# Patient Record
Sex: FEMALE | Race: ASIAN | URBAN - METROPOLITAN AREA
[De-identification: names, ages, dates, MRNs, and addresses within clinical notes are randomized per-mention and may not be internally consistent; named-entity substitution may affect disease eponyms.]

---

## 2017-04-18 ENCOUNTER — OFFICE VISIT (OUTPATIENT)
Dept: OPHTHALMOLOGY | Facility: CLINIC | Age: 82
End: 2017-04-18
Attending: OPHTHALMOLOGY
Payer: COMMERCIAL

## 2017-04-18 DIAGNOSIS — H35.30 MYOPIC MACULAR DEGENERATION: Primary | ICD-10-CM

## 2017-04-18 DIAGNOSIS — H35.022: ICD-10-CM

## 2017-04-18 DIAGNOSIS — H35.3190 AGE-RELATED MACULAR DEGENERATION WITH CENTRAL GEOGRAPHIC ATROPHY: ICD-10-CM

## 2017-04-18 DIAGNOSIS — H33.002: ICD-10-CM

## 2017-04-18 DIAGNOSIS — Z96.1 PSEUDOPHAKIA: ICD-10-CM

## 2017-04-18 PROCEDURE — 99214 OFFICE O/P EST MOD 30 MIN: CPT | Mod: ZF

## 2017-04-18 PROCEDURE — 92015 DETERMINE REFRACTIVE STATE: CPT | Mod: ZF

## 2017-04-18 PROCEDURE — 92134 CPTRZ OPH DX IMG PST SGM RTA: CPT | Mod: ZF | Performed by: OPHTHALMOLOGY

## 2017-04-18 PROCEDURE — 76512 OPH US DX B-SCAN: CPT | Mod: ZF | Performed by: OPHTHALMOLOGY

## 2017-04-18 ASSESSMENT — CONF VISUAL FIELD
OD_NORMAL: 1
OS_SUPERIOR_NASAL_RESTRICTION: 1
OS_INFERIOR_NASAL_RESTRICTION: 1
OS_INFERIOR_TEMPORAL_RESTRICTION: 1
OS_SUPERIOR_TEMPORAL_RESTRICTION: 1

## 2017-04-18 ASSESSMENT — TONOMETRY
IOP_METHOD: ICARE
OS_IOP_MMHG: 09
OD_IOP_MMHG: 13

## 2017-04-18 ASSESSMENT — REFRACTION_MANIFEST
OS_AXIS: 135
OS_CYLINDER: +0.75
OS_SPHERE: -5.75
OD_AXIS: 010
OD_ADD: +2.50
OD_CYLINDER: +1.50
OD_SPHERE: -3.25
OS_ADD: +2.50

## 2017-04-18 ASSESSMENT — VISUAL ACUITY
OS_SC: CF 1'
OD_SC: 20/125
METHOD: SNELLEN - LINEAR

## 2017-04-18 ASSESSMENT — EXTERNAL EXAM - LEFT EYE: OS_EXAM: NORMAL

## 2017-04-18 ASSESSMENT — SLIT LAMP EXAM - LIDS
COMMENTS: NORMAL
COMMENTS: NORMAL

## 2017-04-18 ASSESSMENT — EXTERNAL EXAM - RIGHT EYE: OD_EXAM: NORMAL

## 2017-04-18 NOTE — PROGRESS NOTES
Gretchen Hernandez is a 82 year old female who presents for evaluation of poor vision both eyes for many years.  reports that vision remains poor after cataract surgery last year and that she needs new glasses. She was seeing an ophthalmologist in [place] who started her on AREDS.  Denies ocular injections or previous surgery aside from cataract extraction.  She has had numerous falls in the past with head injury.  Denies globe injury.  Per , left eye was felt to possible have optic nerve injury following a fall and vision was unable to be improved.  Reports very high glasses prescription prior to cataract extraction     Ocular Meds  AREDS    Assessment & Plan      Gretchen Hernandez is a 82 year old female with the following diagnoses:   1. Myopic macular degeneration    2. 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 Detachment, retinal, with retinal defect, left    4. Exudative retinopathy, left    5. Pseudophakia - Both Eyes         Likely myopic degeneration given fundus appearance and history of strong prescription.  Remains significantly myopic following cataract extraction   Newly diagnosed macular retinal detachment today, patient does not report recent vision change left eye since a fall that occurred 10 years ago  Bscan today confirms chronic appearing exudative retinal detachment at the macula.  No underlying mass.  Scleral thickening, ? Possible chronic hypotony vs early phthisis  Significant Geographic atrophy in both eyes   Visual acuity improved with glasses right eye.  Recommend prescription with full-time wear for monocular precaution.    Recommend consultation with retina clinic to assess left eye and establish care/monitoring for myopic degeneration  Consider low vision consult after retina exam    Patient disposition:   Return in about 4 weeks (around 5/16/2017) for Follow Up, Retina.          Rosendo Syed MD    Attending Physician Attestation: Complete documentation of  historical and exam elements from today's encounter can be found in the full encounter summary report (not reduplicated in this progress note). I personally obtained the chief complaint(s) and history of present illness.  I confirmed and edited as necessary the review of systems, past medical/surgical history, family history, social history, and examination findings as documented by others; and I examined the patient myself. I personally reviewed the relevant tests, images, and reports as documented above. I formulated and edited as necessary the assessment and plan and discussed the findings and management plan with the patient and family. - Haider Parsons MD  4/19/2017   Attending Physician Image/Tesing Attestation: I have personally view and interpreted all testing/images as documented in imaging/procedures

## 2017-04-18 NOTE — LETTER
4/18/2017       RE: Gretchen Hernandez  [address]  Located within [facility] [address]     Dear Colleague,    Thank you for referring your patient, Gretchen Hernandez, to the EYE CLINIC at [facility]. Please see a copy of my visit note below.    Gretchen Hernandez is a 82 year old female who presents for evaluation of poor vision both eyes for many years.  reports that vision remains poor after cataract surgery last year and that she needs new glasses. She was seeing an ophthalmologist in [place] who started her on AREDS.  Denies ocular injections or previous surgery aside from cataract extraction.  She has had numerous falls in the past with head injury.  Denies globe injury.  Per , left eye was felt to possible have optic nerve injury following a fall and vision was unable to be improved.  Reports very high glasses prescription prior to cataract extraction     Ocular Meds  AREDS    Assessment & Plan      Gretchen Hernandez is a 82 year old female with the following diagnoses:   1. Myopic macular degeneration    2. 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 Detachment, retinal, with retinal defect, left    4. Exudative retinopathy, left    5. Pseudophakia - Both Eyes         Likely myopic degeneration given fundus appearance and history of strong prescription.  Remains significantly myopic following cataract extraction   Newly diagnosed macular retinal detachment today, patient does not report recent vision change left eye since a fall that occurred 10 years ago  Bscan today confirms chronic appearing exudative retinal detachment at the macula.  No underlying mass.  Scleral thickening, ? Possible chronic hypotony vs early phthisis  Significant Geographic atrophy in both eyes   Visual acuity improved with glasses right eye.  Recommend prescription with full-time wear for monocular precaution.    Recommend consultation with retina clinic to assess left eye and  establish care/monitoring for myopic degeneration  Consider low vision consult after retina exam    Patient disposition:   Return in about 4 weeks (around 5/16/2017) for Follow Up, Retina.          Rosendo Syed MD    Attending Physician Attestation: Complete documentation of historical and exam elements from today's encounter can be found in the full encounter summary report (not reduplicated in this progress note). I personally obtained the chief complaint(s) and history of present illness.  I confirmed and edited as necessary the review of systems, past medical/surgical history, family history, social history, and examination findings as documented by others; and I examined the patient myself. I personally reviewed the relevant tests, images, and reports as documented above. I formulated and edited as necessary the assessment and plan and discussed the findings and management plan with the patient and family. - Haider Parsons MD  4/19/2017   Attending Physician Image/Tesing Attestation: I have personally view and interpreted all testing/images as documented in imaging/procedures       Again, thank you for allowing me to participate in the care of your patient.      Sincerely,    Haider Parsons MD

## 2017-04-18 NOTE — MR AVS SNAPSHOT
After Visit Summary   4/18/2017    Gretchen Hernandez    MRN: [id number]           Patient Information     Date Of Birth          12/20/1934        Visit Information        Provider Department      4/18/2017 2:15 PM Haider Parsons MD; [facility]        Today's Diagnoses     Macular degeneration - Both Eyes    -  1    Exudative retinopathy, left        Detachment, retinal, with retinal defect, left           Follow-ups after your visit        Follow-up notes from your care team     Return in about 4 weeks (around 5/16/2017) for Follow Up, Retina.      Your next 10 appointments already scheduled     Jul 25, 2017 12:30 PM CDT   NEW RETINA with Bibiana Johnson MD   Eye Clinic ([facility])    [address]  [address]  [address]  [facility] [id number] [phone]              Who to contact     Please call your clinic at [phone] to:    Ask questions about your health    Make or cancel appointments    Discuss your medicines    Learn about your test results    Speak to your doctor   If you have compliments or concerns about an experience at your clinic, or if you wish to file a complaint, please contact [facility] Patient Relations at [phone] or email us at [email or web address]         Additional Information About Your Visit        MyChart Information     [employer] is an electronic gateway that provides easy, online access to your medical records. With [employer], you can request a clinic appointment, read your test results, renew a prescription or communicate with your care team.     To sign up for [employer] visit the website at [email or web address]   You will be asked to enter the access code listed below, as well as some personal information. Please follow the directions to create your username and password.     Your access code is: [email or web address]  Expires: 7/10/2017  6:30 AM     Your  access code will  in 90 days. If you need help or a new code, please contact your [facility] or call [phone] for assistance.        Care EveryWhere ID     This is your Care EveryWhere ID. This could be used by other organizations to access your [facility] medical records  [id number]         Blood Pressure from Last 3 Encounters:   No data found for BP    Weight from Last 3 Encounters:   No data found for Wt              We Performed the Following     OCT Retina Spectralis OU (both eyes)     Ultrasound B-scan OS (left eye)        Primary Care Provider Office Phone # Fax #    Devorah Pichardo [phone]       [facility] [address] [facility] [address]        Thank you!     Thank you for choosing EYE CLINIC  for your care. Our goal is always to provide you with excellent care. Hearing back from our patients is one way we can continue to improve our services. Please take a few minutes to complete the written survey that you may receive in the mail after your visit with us. Thank you!             Your Updated Medication List - Protect others around you: Learn how to safely use, store and throw away your medicines at www.disposemymeds.org.          This list is accurate as of: 17  5:18 PM.  Always use your most recent med list.                   Brand Name Dispense Instructions for use    ALENDRONATE-CHOLECALCIFEROL PO          AMLODIPINE BESYLATE PO          B-12 PO          VITEYES AREDS FORMULA Caps

## 2017-04-18 NOTE — NURSING NOTE
Chief Complaints and History of Present Illnesses   Patient presents with     Consult For     Blurry vision.     HPI    Affected eye(s):  Both   Symptoms:     Blurred vision   Decreased vision   Dryness         Do you have eye pain now?:  No      Comments:  Pt's  states she fell twice and has injured the LE and now has low vision. Fell last year.   Xenia Leslie COA 2:51 PM April 18, 2017